# Patient Record
(demographics unavailable — no encounter records)

---

## 2024-11-07 NOTE — PHYSICAL EXAM
[Normal] : moves all extremities, no focal deficits, normal speech [de-identified] : No carotid bruits auscultated bilaterally

## 2024-11-07 NOTE — HISTORY OF PRESENT ILLNESS
[FreeTextEntry1] : Historical Perspective: 65 year old male with PMHx of HTN, HLD, gout, obesity, MARTÍNEZ on CPAP, dilated aortic root presents in follow up. Patient notices daytime fatigue. He has no exertional chest pain, abnormal dyspnea, or palpitations. He states his CPAP is old and hasn't had a titration in study in many many years.   In the past due to persistent hypertension he was changed from Valsartan/HCTZ to Edarbychlor 40/25mg daily.  He also has non-obstructive CAD by CT 2006.  Diagnosed with Parkinson Disease.  Current Health Status: Patient with no chest pain, SOB, occasional palpitations. No hospitalizations since seeing me last. Remains compliant with his medications and reports no adverse effects.

## 2024-11-07 NOTE — CARDIOLOGY SUMMARY
[de-identified] : Pharmacologic nuclear stress testing performed April 27, 2018. Experienced hypotension and bradycardia with Lexiscan. Resolved with Aminophylline. There was diaphragmatic artifact which resolved with prone imaging. Gated wall motion analysis revealed hypokinesis with an LVEF of 40%. In contrast to echocardiogram. \par   [de-identified] : 11/7/2024, NSR with RBBB and LAFB 5/10/2024, Sinus Bradycardia, RBBB and LAFB 11/10/2023, Sinus Bradycardia, RBBB and LAFB 5/4/2023, Sinus Bradycardia, RBBB, LAFB 2/23/2023, Sinus Bradycardia, RBBB, LAFB [de-identified] : 11/1/2024, LV EF 55-60%, mild LVH, ascending aorta 4.7cm, trace MR, trace TR with estimated PASP of 18mmHg 2/23/2023, LV EF 55-60%, mild LVH, ascending aorta 4.4cm, trace MR, mild TR with estimated PASP of 31mmHg. 9/21/2020, mild MR, ascending aorta at 4.3cm, mild RVE and PAKO, mild TR, mild-moderate PI LVEF 60-65%.  [de-identified] : 3/24/2023, CTA of Aorta: 4.4cm ascending aorta\par  8/28/2016, CTA coronaries: calcium score 7, prox circ 5-15% also noted ascending aorta 4.2cm

## 2024-11-07 NOTE — REVIEW OF SYSTEMS
[Joint Pain] : joint pain [Joint Stiffness] : joint stiffness [Negative] : Gastrointestinal [de-identified] : see HPI [FreeTextEntry5] : see HPI

## 2024-11-07 NOTE — DISCUSSION/SUMMARY
[FreeTextEntry1] : 1. HTN:  elevated. Recommend discontinuing Toprol XL 25mg daily (high risk medication with no signs of toxicity). Start Labetalol 200mg BID. Continue Nifedipine ER 30mg daily. Recommend continuing olmesartan 40mg daily. Goal BP less than 130/80. Recommend low salt diet.   2. h/o dilated ascending aorta: echocardiogram, 2/23/2023, demonstrated proximal ascending aorta at 4.4cm. Recommended CTA of the aorta, which confirmed 4.4 cm TAA on 3/24/2023.  No AAA noted on 10/11/2019 abdominal aortic US. Continue periodic echo surveillance, beta blocker therapy, and adequate BP control.  3. Nonobstructive CAD by CT 8-28-06 (calcium score 7 with circ 5-15%). Nuclear stress test 4-27-18 no ischemia. Patient is currently stable and asymptomatic.  4. hyperlipidemia. On Omega and statin.  5. Abnormal ECG: no structural heart disease on echocardiogram.   6. PVCs/Palpitations: occasional PVCs see on Zio monitor from 7/22/2022. Continue beta blocker therapy.  Follow up in 6 weeks for BP check.   [EKG obtained to assist in diagnosis and management of assessed problem(s)] : EKG obtained to assist in diagnosis and management of assessed problem(s)

## 2024-11-12 NOTE — PHYSICAL EXAM
[Normal Appearance] : normal appearance [Well Groomed] : well groomed [General Appearance - In No Acute Distress] : no acute distress [Edema] : no peripheral edema [Respiration, Rhythm And Depth] : normal respiratory rhythm and effort [Exaggerated Use Of Accessory Muscles For Inspiration] : no accessory muscle use [Abdomen Soft] : soft [Abdomen Tenderness] : non-tender [Costovertebral Angle Tenderness] : no ~M costovertebral angle tenderness [Urinary Bladder Findings] : the bladder was normal on palpation [Prostate Tenderness] : the prostate was not tender [No Prostate Nodules] : no prostate nodules [Prostate Size ___ (0-4)] : prostate size [unfilled] (scale: 0-4) [Normal Station and Gait] : the gait and station were normal for the patient's age [] : no rash [No Focal Deficits] : no focal deficits [Oriented To Time, Place, And Person] : oriented to person, place, and time [Affect] : the affect was normal [Mood] : the mood was normal [No Palpable Adenopathy] : no palpable adenopathy

## 2024-11-12 NOTE — HISTORY OF PRESENT ILLNESS
[FreeTextEntry1] : As per Dr Abdi last note 66-year-old patient presented today for the follow-up. Last time we found enlarged prostate. Patient's IPSS could be described as 12. We started with the tamsulosin at patient today presented reported improvement of urination.  11/12/24 Pt comes in follow up LUTS. Pt feels flomax works well. Both father and uncle had prostate cancer.              12/16/23 PSA 3.12

## 2024-11-26 NOTE — HISTORY OF PRESENT ILLNESS
[FreeTextEntry1] : As per Dr Abdi last note 66-year-old patient presented today for the follow-up. Last time we found enlarged prostate. Patient's IPSS could be described as 12. We started with the tamsulosin at patient today presented reported improvement of urination.  11/12/24 Pt comes in follow up LUTS. Pt feels flomax works well. Both father and uncle had prostate cancer.              12/16/23 PSA 3.12  11/26/24 Pt come sin follow up.              11/13/24 PSA 3.0

## 2024-12-20 NOTE — DISCUSSION/SUMMARY
[FreeTextEntry1] : 1. HTN:  Continue Labetalol 200mg BID. Continue Nifedipine ER 30mg daily. Recommend continuing olmesartan 40mg daily. Goal BP less than 130/80. Recommend low salt diet.   2. h/o dilated ascending aorta: echocardiogram, 2/23/2023, demonstrated proximal ascending aorta at 4.4cm. Recommended CTA of the aorta, which confirmed 4.4 cm TAA on 3/24/2023.  No AAA noted on 10/11/2019 abdominal aortic US. Continue periodic echo surveillance, beta blocker therapy, and adequate BP control.  3. Nonobstructive CAD by CT 8-28-06 (calcium score 7 with circ 5-15%). Nuclear stress test 4-27-18 no ischemia. Patient is currently stable and asymptomatic.  4. hyperlipidemia. On Omega and statin.  5. Abnormal ECG: no structural heart disease on echocardiogram.   6. PVCs/Palpitations: occasional PVCs see on Zio monitor from 7/22/2022. Continue beta blocker therapy.  Follow up in 6 months

## 2024-12-20 NOTE — CARDIOLOGY SUMMARY
[de-identified] : Pharmacologic nuclear stress testing performed April 27, 2018. Experienced hypotension and bradycardia with Lexiscan. Resolved with Aminophylline. There was diaphragmatic artifact which resolved with prone imaging. Gated wall motion analysis revealed hypokinesis with an LVEF of 40%. In contrast to echocardiogram. \par   [de-identified] : 11/7/2024, NSR with RBBB and LAFB 5/10/2024, Sinus Bradycardia, RBBB and LAFB 11/10/2023, Sinus Bradycardia, RBBB and LAFB 5/4/2023, Sinus Bradycardia, RBBB, LAFB 2/23/2023, Sinus Bradycardia, RBBB, LAFB [de-identified] : 11/1/2024, LV EF 55-60%, mild LVH, ascending aorta 4.7cm, trace MR, trace TR with estimated PASP of 18mmHg 2/23/2023, LV EF 55-60%, mild LVH, ascending aorta 4.4cm, trace MR, mild TR with estimated PASP of 31mmHg. 9/21/2020, mild MR, ascending aorta at 4.3cm, mild RVE and PAKO, mild TR, mild-moderate PI LVEF 60-65%.  [de-identified] : 3/24/2023, CTA of Aorta: 4.4cm ascending aorta\par  8/28/2016, CTA coronaries: calcium score 7, prox circ 5-15% also noted ascending aorta 4.2cm

## 2024-12-20 NOTE — HISTORY OF PRESENT ILLNESS
[FreeTextEntry1] : Historical Perspective: 66 year old male with PMHx of HTN, HLD, gout, obesity, MARTÍNEZ on CPAP, dilated aortic root presents in follow up. Patient notices daytime fatigue. He has no exertional chest pain, abnormal dyspnea, or palpitations. He states his CPAP is old and hasn't had a titration in study in many many years.   In the past due to persistent hypertension he was changed from Valsartan/HCTZ to Edarbychlor 40/25mg daily.  He also has non-obstructive CAD by CT 2006.  Diagnosed with Parkinson Disease.  Current Health Status: Patient with no chest pain, SOB, occasional palpitations. No hospitalizations since seeing me last. Remains compliant with his medications and reports no adverse effects.

## 2024-12-20 NOTE — PHYSICAL EXAM
[Normal] : moves all extremities, no focal deficits, normal speech [de-identified] : No carotid bruits auscultated bilaterally

## 2024-12-20 NOTE — REVIEW OF SYSTEMS
[Joint Pain] : joint pain [Joint Stiffness] : joint stiffness [Negative] : Gastrointestinal [FreeTextEntry5] : see HPI [de-identified] : see HPI

## 2025-03-14 NOTE — HISTORY OF PRESENT ILLNESS
[FreeTextEntry1] : As per Dr Abdi last note 66-year-old patient presented today for the follow-up. Last time we found enlarged prostate. Patient's IPSS could be described as 12. We started with the tamsulosin at patient today presented reported improvement of urination.  11/12/24 Pt comes in follow up LUTS. Pt feels flomax works well. Both father and uncle had prostate cancer.              12/16/23 PSA 3.12  11/26/24 Pt comes in follow up.              11/13/24 PSA 3.0  3/14/25 Pt comes in follow up. Pt feels sometimes has to push to urinate but does not want to increase or change his medication. Pt having ED and would like to retry viagra or cialis.               11/13/24 PSA 3.0

## 2025-05-27 NOTE — HISTORY OF PRESENT ILLNESS
[FreeTextEntry1] : As per Dr Abdi last note 66-year-old patient presented today for the follow-up. Last time we found enlarged prostate. Patient's IPSS could be described as 12. We started with the tamsulosin at patient today presented reported improvement of urination.  11/12/24 Pt comes in follow up LUTS. Pt feels flomax works well. Both father and uncle had prostate cancer.              12/16/23 PSA 3.12  11/26/24 Pt comes in follow up.              11/13/24 PSA 3.0  3/14/25 Pt comes in follow up. Pt feels sometimes has to push to urinate but does not want to increase or change his medication. Pt having ED and would like to retry viagra or cialis.               11/13/24 PSA 3.0  5/27/25 Pt comes in follow up. Pt feels viagra 100mg helping

## 2025-06-06 NOTE — CARDIOLOGY SUMMARY
[de-identified] : 6/6/2025, NSR with RBBB and LAFB 11/7/2024, NSR with RBBB and LAFB 5/10/2024, Sinus Bradycardia, RBBB and LAFB 11/10/2023, Sinus Bradycardia, RBBB and LAFB 5/4/2023, Sinus Bradycardia, RBBB, LAFB 2/23/2023, Sinus Bradycardia, RBBB, LAFB [de-identified] : Pharmacologic nuclear stress testing performed April 27, 2018. Experienced hypotension and bradycardia with Lexiscan. Resolved with Aminophylline. There was diaphragmatic artifact which resolved with prone imaging. Gated wall motion analysis revealed hypokinesis with an LVEF of 40%. In contrast to echocardiogram. \par   [de-identified] : 11/1/2024, LV EF 55-60%, mild LVH, ascending aorta 4.7cm, trace MR, trace TR with estimated PASP of 18mmHg 2/23/2023, LV EF 55-60%, mild LVH, ascending aorta 4.4cm, trace MR, mild TR with estimated PASP of 31mmHg. 9/21/2020, mild MR, ascending aorta at 4.3cm, mild RVE and PAKO, mild TR, mild-moderate PI LVEF 60-65%.  [de-identified] : 3/24/2023, CTA of Aorta: 4.4cm ascending aorta\par  8/28/2016, CTA coronaries: calcium score 7, prox circ 5-15% also noted ascending aorta 4.2cm

## 2025-06-06 NOTE — HISTORY OF PRESENT ILLNESS
[FreeTextEntry1] : Historical Perspective: 66 year old male with PMHx of HTN, HLD, gout, obesity, MARTÍNEZ on CPAP, dilated aortic root presents in follow up. Patient notices daytime fatigue. He has no exertional chest pain, abnormal dyspnea, or palpitations. He states his CPAP is old and hasn't had a titration in study in many many years.   In the past due to persistent hypertension he was changed from Valsartan/HCTZ to Edarbychlor 40/25mg daily.  He also has non-obstructive CAD by CT 2006.  Diagnosed with Parkinson Disease.  Current Health Status: Patient with no chest pain, SOB, occasional palpitations. Having intermittent lightheadedness, maybe 1 episode every 2 weeks. No syncope. No obvious triggers. No hospitalizations since seeing me last. Remains compliant with his medications and reports no adverse effects.

## 2025-06-06 NOTE — PHYSICAL EXAM
[Normal] : moves all extremities, no focal deficits, normal speech [de-identified] : No carotid bruits auscultated bilaterally

## 2025-06-06 NOTE — DISCUSSION/SUMMARY
[FreeTextEntry1] : 1. HTN:  Continue Labetalol 200mg BID. Continue Nifedipine ER 30mg daily. Recommend continuing olmesartan 40mg daily. Goal BP less than 130/80. Recommend low salt diet.   2. h/o dilated ascending aorta: echocardiogram, 2/23/2023, demonstrated proximal ascending aorta at 4.4cm. Recommended CTA of the aorta, which confirmed 4.4 cm TAA on 3/24/2023.  No AAA noted on 10/11/2019 abdominal aortic US. Continue periodic echo surveillance, beta blocker therapy, and adequate BP control.  3. Nonobstructive CAD by CT 8-28-06 (calcium score 7 with circ 5-15%). Nuclear stress test 4-27-18 no ischemia. Patient is currently stable and asymptomatic.  4. hyperlipidemia. On Omega and statin.  5. Abnormal ECG: no structural heart disease on echocardiogram.   6. PVCs/Palpitations: occasional PVCs see on Zio monitor from 7/22/2022. Continue beta blocker therapy. Now having intermittent lightheadedness. Recommend 14 Day [facility]  Follow up in 6 months [EKG obtained to assist in diagnosis and management of assessed problem(s)] : EKG obtained to assist in diagnosis and management of assessed problem(s)